# Patient Record
Sex: MALE | Race: WHITE
[De-identification: names, ages, dates, MRNs, and addresses within clinical notes are randomized per-mention and may not be internally consistent; named-entity substitution may affect disease eponyms.]

---

## 2018-10-29 ENCOUNTER — HOSPITAL ENCOUNTER (OUTPATIENT)
Dept: HOSPITAL 80 - FIMAGING | Age: 53
End: 2018-10-29
Attending: PSYCHIATRY & NEUROLOGY
Payer: MEDICAID

## 2018-10-29 DIAGNOSIS — G95.20: ICD-10-CM

## 2018-10-29 DIAGNOSIS — M50.31: ICD-10-CM

## 2018-10-29 DIAGNOSIS — M50.20: ICD-10-CM

## 2018-10-29 DIAGNOSIS — M48.02: ICD-10-CM

## 2018-10-29 DIAGNOSIS — M50.322: ICD-10-CM

## 2018-10-29 DIAGNOSIS — R94.02: Primary | ICD-10-CM

## 2018-10-29 PROCEDURE — A9585 GADOBUTROL INJECTION: HCPCS

## 2018-12-13 NOTE — POSTOPPROG
Post Op Note


Date of Operation: 12/13/18


Surgeon: Jorge Gonzalez


Assistant: Shira Harkins PA-C


Anesthesiologist: Dr. Madrid


Anesthesia: GET(General Endotracheal)


Pre-op Diagnosis: Cervical stenosis


Post-op Diagnosis: Cervical stenosis


Procedure: ACDF C3/4 and C5/6


Inf/Abcess present in the surg proc area at time of surgery?: No


Depth: Deep Incisional (Fascial)


EBL: Minimal


Drains: Leland Eddy





Plan


Plan: 


54 yo male s/p ACDF C3/4 and C5/6


- neuro checks


- pain control


- advance diet as tolerated


- hard collar


- ZO drain


- postop x-rays in am


- PT/OT/SLP





Exam


Seen in recovery.


Awake. Alert.


Following commands


Strength full


Incision with dressing c/d/i

## 2018-12-13 NOTE — PDANEPAE
ANE History of Present Illness





acdf





ANE Past Medical History





- Cardiovascular History


Hx Hypertension: No


Hx Arrhythmias: No


Hx Chest Pain: No


Hx Coronary Artery / Peripheral Vascular Disease: No


Hx CHF / Valvular Disease: No


Hx Palpitations: No





- Pulmonary History


Hx COPD: Yes


Hx Asthma/Reactive Airway Disease: No


Hx Recent Upper Respiratory Infection: No


Hx Oxygen in Use at Home: No


Hx Sleep Apnea: No





- Neurologic History


Hx Cerebrovascular Accident: No


Hx Seizures: No


Hx Dementia: No





- Endocrine History


Hx Diabetes: No


Hypothyroid: No


Hyperthyroid: No


Obesity: no





- Renal History


Hx Renal Disorders: No





- Liver History


Hx Hepatic Disorders: No





- Cancer History


Hx Cancer: Yes


Cancer History Comment: hodgkin's lymphoma





ANE Review of Systems


Review of Systems: 








- Exercise capacity


Exercise capacity: >=4 METS





ANE Patient History





- Allergies


Allergies/Adverse Reactions: 








No Known Allergies Allergy (Unverified 12/07/18 15:30)


 








- Home Medications


Home medications: home medication list seen and reviewed


Home Medications: 








Vitamin B Complex [Vitamin B Complex (OTC)] 1 each PO DAILY 12/07/18 [Last 

Taken 12/06/18]


Herbals/Supplements -Info Only  12/13/18 [Last Taken 12/10/18]








- NPO status


NPO Status: no food or drink >8 hours


NPO Since - Liquids (Date): 12/12/18


NPO Since - Liquids (Time): 20:00


NPO Since - Solids (Date): 12/12/18


NPO Since - Solids (Time): 20:00





- Anes Hx


Anes Hx: no prior problems





ANE Labs/Vital Signs





- Vital Signs


Blood Pressure: 112/67


Heart Rate: 75


Respiratory Rate: 20


O2 Sat (%): 93


Height: 177.8 cm


Weight: 72.575 kg





ANE Physical Exam





- Airway


Mallampati Score: Class 2


Mouth exam: normal dental/mouth exam, poor dentition





- Pulmonary


Pulmonary: no respiratory distress





- Cardiovascular


Cardiovascular: regular rate and rhythym





- ASA Status


ASA Status: II





ANE Anesthesia Plan


Anesthesia Plan: general endotracheal anesthesia

## 2018-12-14 NOTE — PDIAF
- Diagnosis


Diagnosis: Cervical stenosis s/p ACDF


Code Status: Full Code





- Medication Management


Discharge Medications: electronically signed and located in the Home Medication 

List.





- Orders


Services needed: Home Care, Certified Nursing Aide


Home Care Face to Face: I certify that this patient was under my care and that 

I had the required face-to-face encounter meeting the encounter requirements on 

the discharge day.  My findings support the fact that the patient is homebound 

as defined in


Home Care Face to Face Continued: CMS Chapter 7 Medicare Benefits Manual 30.1.1

, The condition of the patient is such that there exists a normal inability to 

leave home and consequently, leaving home would require a considerable and 

taxing effort.


Diet Recommendation: no restrictions on diet


Diet Texture: Regular Texture Diet


Wound Care Instructions: OK to shower and get incision wet starting Sunday 12/ 16.  Be gentle. No scrubbing.


Activity/Weight Bearing Restrictions: Wear collar at all times.  Refrain from 

lifting more than 10 pounds.


Additional Instructions: 


Wear collar at all times


Refrain from lifting more than 10 pounds or strenuous activity


Avoid Ibuprofen, Motrin, Aleve, Advil.  OK to take Tylenol.


Call Dr. Gonzalez's office with any questions/concerns.  [phone].  FU in 2 

weeks.





- Follow Up Care


Current Providers and Referrals: 


NONE *PRIMARY CARE P,. [Primary Care Provider] - 


Jorge Gonzalez MD [Medical Doctor] - follow up in 2 weeks

## 2018-12-14 NOTE — NEUSURGPN
Date of Surgery: 12/13/18


Post Op Day: 1


Assessment/Plan: 





52 yo male s/p ACDF C3/4 and C5/6





- neuro stable


- pain controlled


- postop C-spine x-rays pending


- remove ZO drain today


- PT/OT/SLP


- If continues to progress well then will discharge home today





Discussed with Dr. Gonzalez.


Subjective: 


Has residual fingertip numbness.  No new complaints.


Objective: 


Awake. Alert. PERRL. EOMI


Facial expression symmetrical


Muscle strength full at 5/5


Sensation intact


Incision with dressing c/d/i





- Physician


Discussed Patient with Dr.: Gonzalez





Neurosurgery Physical Exam





- Vitals, I&O, Labs





 I and O











 12/13/18 12/14/18 12/15/18





 05:59 05:59 05:59


 


Intake Total  1930 


 


Output Total  1265 


 


Balance  665 


 


Weight  72.575 kg 


 


Intake:   


 


  Oral (ml)  980 


 


  IV Intake (ml)  950 


 


Output:   


 


  Urine (ml)  1200 


 


    Urinal  1200 


 


  Estimated Blood Loss (ml)  25 


 


  ZO Drain Output (ml)  40 


 


    Anterior Neck Leland  40 





    Eddy   








 Vital Signs











Temp Pulse Resp BP Pulse Ox


 


 36.5 C   73   14   127/71 H  94 


 


 12/14/18 07:47  12/14/18 07:47  12/14/18 07:47  12/14/18 07:47  12/14/18 07:47














ICD10 Worksheet


Patient Problems: 


 Problems











Problem Status Onset


 


Cervical stenosis of spine Acute  














- ICD10 Problem Qualifiers


(1) Cervical stenosis of spine

## 2018-12-14 NOTE — ASMTDCNOTE
Case Management Discharge

 

Discharge Order Complete?     Answers:  Yes                                   

Patient to Obtain             Answers:  Other                         Notes:  Friend

Medications                                                                   

Transportation Arranged       Answers:  Family/Friends                        

Faxed Final Orders            Answers:  Yes                           Notes:  [facility] has access to EMR

Family Notified               Answers:  Yes                           Notes:  CM spoke with pt's frie
nd

Discharge Comments            

Notes:

CM spoke with pt, RN and friend who is supportive of pt. Pt reports that he lives in  on a 

property and doesn't have running water in it but can walk to a bathroom nearby. Pt reports he is 

not concerned about his living conditions and wants to be discharged home.  Pt reports he has a 

heater at home. Pt is agreeable to receiving meals on wheels. Pt's friend, Narcisa 

([phone]) said she arranged for meals to start arriving after discharge. Narcisa requested 

that pt recieved [facility] services. Pt reports PCP is Akshat Vivas at hospitals in [place]. CM provided 

education to pt and eventually was agreeable since pt's friend is going to be out of town in the 

near future.  CM linked pt with [facility]. Pt's physical address is: [facility] [address] [place] [address]. CM 


provided pt education on Medicaid transportation for MD appts. Pt appreciative of information. CM 

contacted pt's friend who is able to provide transportation home at discharge. CM answered 

questions and concerns and pt agreeable to discharge plan. 



Plan: Home with [facility]. 

 

Date Signed:  12/14/2018 02:10 PM

Electronically Signed By:PAYAM Crenshaw

## 2018-12-14 NOTE — ASMTLACE
LACE

 

Length of stay for            Answers:  1 day                                 

current admission                                                             

Acuity / Level of             Answers:  Yes                                   

Care: Did the patient                                                         

have an inpatient                                                             

admission?                                                                    

Comorbidities - select        Answers:  Any tumor (including                  

all that apply                          lymphoma or leukemia)                 

                                        Chronic pulmonary disease             

                                        Opioid dependence                     

                                        / Chronic pain                        

# of Emergency department     Answers:  0                                     

visits in the last 6                                                          

months                                                                        

Social determinants           Answers:  History of substance                  

                                        abuse (ETOH, street                   

                                        drugs, prescription                   

                                        drugs, etc.)                          

                                        Lack of community                     

                                        resources and/or lack of              

                                        social support (no                    

                                        pcp, lives                            

                                        alone, transportation, abigail            

                                        d)                                    

Score: 19

 

Date Signed:  12/14/2018 02:21 PM

Electronically Signed By:PAYAM Crenshaw

## 2018-12-14 NOTE — PDMN
Medical Necessity


Medical necessity: Pt meets IP criteria as of 12/13/2018 per MD and MCG SG-MS; 

los > 2 mn s/p ACDF C3-4, 5-6; Medicare IP only procedure

## 2018-12-15 NOTE — GOP
DATE OF OPERATION:  12/13/2018



SURGEON:  Jorge Gonzalez MD



ASSISTANT:  Shira Harkins PA-C.



PREOPERATIVE DIAGNOSIS:  Cervical myelopathy.



POSTOPERATIVE DIAGNOSIS:  Cervical myelopathy.



PROCEDURE PERFORMED:  

1.  C3-4 anterior cervical diskectomy and fusion.

2.  C5-6 anterior cervical diskectomy and fusion.

3.  Placement of interbody device, C3-4, C5-6.

4.  Anterior spinal instrumentation, C3-4, C5-6.

5.  [place] of local autograft.

6.  Use of allograft demineralized bone matrix.

7.  Use of the operative microscope.

8.  Intraoperative neurophysiologic monitoring of somatosensory evoked potentials and motor evoked po
tentials.



FINDINGS:  Successful ACDF.



SPECIMENS:  There were no specimens.



ESTIMATED BLOOD LOSS:  30 cc.



INDICATIONS:  The patient is a 53-year-old man who presented with increasing numbness and weakness of
 his hands.  MRI showed severe stenosis at C3-4 with cord compression and signal change as well as le
ss severe stenosis with signs of myelomalacia and cord signal changes C5-6.  Ultimately, we discussed
 the options and elected to proceed with C3-4 and C5-6 anterior cervical diskectomy and fusion.  We d
iscussed the utility of doing C4-5.  However, the disk at that level looks relatively normal.  Theref
ore, we elected not to include this.



DESCRIPTION OF PROCEDURE:  After informed consent was obtained from the patient, the patient brought 
to the operating room and was placed in supine position on the operating table.  A formal time-out wa
s performed, identifying the patient by name, medical record number, and date of birth.  Preoperative
 antibiotics were given.  The endotracheal tube was placed and general endotracheal anesthesia was sm
oothly induced.  A shoulder roll was placed and the neck was extended onto a headrest.  A lateral rad
iograph confirmed the level of the incision to bisect between C3-4 and C5-6 disk spaces.  The neck wa
s prepped and draped in normal sterile fashion.  10 cc of 0.25% Marcaine with epinephrine was infiltr
ated in the skin for hemostasis.  The skin incision made using a 10 blade.  The subcutaneous tissues 
were dissected using monopolar electrocautery.  The skin was undermined over the platysma and the arya
tysma was opened in line with its fibers just medial to the sternocleidomastoid.  The avascular plane
 between the midline structures and the sternocleidomastoid was then carefully dissected with care no
t to interrupt the neurovascular structures or the esophagus.  The prevertebral fascia was opened and
 the esophagus was retracted medially and a longer plane of dissection was established so that we did
 not have to retract so much from the esophagus.  At this point, the disk space markers were placed a
t C3-4 and C5-6 and lateral radiograph confirmed the appropriate levels.  The disk was then marked us
ing the Bovie, such that the disk spaces were clear.  The longus colli muscles were elevated laterall
y at C3-4 and self-retaining retractors were placed.  Drummond distraction was placed across the C3-4 d
isk space and the operative microscope was brought on the field.  Remainder of procedure was performe
d under high-power magnification.  We began by performing complete diskectomy using curettes and Tapia
taryn punches.  As we coursed to the more posterior aspect of the disk space, the osteophytes were al
te evident and the posterior osteophytes were drilled down.  The posterior longitudinal ligament was 
then opened and remaining disk fragments were removed posteriorly exposing the dura and completely de
compressing from 1 foramen to the other.  Once a good decompression was obtained, the motor evoked po
tentials remained stable.  The endplates were then decorticated and the disk space was sized for a 7 
mm Medtronic PTC cage.  This was packed with locally harvested autograft from the endplates and demin
eralized bone matrix and was placed into the disk space.  A 17 mm Zevo plate was then sized to the an
terior surface of the spine and was secured to the C3 and C4 vertebral bodies using 17 mm titanium sc
rews.  At this point, a lateral radiograph confirmed good placement of all the hardware and the neuro
physiologic monitoring remained stable.  We then moved our retractors down to the C5-6 level.  Again,
 the longus colli muscles were elevated and Drummond distraction was placed across the C5-C6 disk space
.  Again, a complete diskectomy was performed.  The posterior osteophytes were drilled down.  The pos
terior longitudinal ligament was opened and complete decompression of the disk space was obtained fro
m foramen to foramen.  This disk space was then sized for an 8 mm Medtronic PTC cage, which was packe
d with locally harvested autograft and allograft demineralized bone matrix.  This was again placed in
to the C5-C6 disk space and a 21 mm Medtronic Zevo plate was sized and secured to the C5 and C6 verte
bral bodies using 17 mm titanium screws.  At this point, AP and lateral radiograph confirmed good arya
cement of all the hardware and an AP radiograph confirmed that both plates were in the midline.  



At this point, the wound was copiously irrigated using bacitracin irrigation.  All bleeding was contr
olled using bipolar electrocautery.  A 10-Slovenian silastic drain was placed in the prevertebral space 
and tunneled out sterilely.  At this point, when there was no further bleeding, the platysma was clos
ed using interrupted 3-0 Vicryl.  The deep dermis was closed using interrupted 3-0 Vicryl.  The skin 
was closed using Dermabond.  Sterile dressings were placed.  The patient was awakened in the operatin
g room and transferred to the PACU in stable condition.  There were no operative complications.  I wa
s scrubbed and present for the entire procedure. All sponge and needle counts were correct at the end
 of the case.



FLUIDS AND URINE OUTPUT:  Per the anesthesia record.



DRAIN:  10-Slovenian ZO.





Job #:  [id number]

## 2018-12-18 NOTE — GDS
ADMITTING DIAGNOSIS:  Cervical myelopathy.



DISCHARGE DIAGNOSIS:  Cervical myelopathy.



PROCEDURE PERFORMED:  Anterior cervical diskectomy and fusion at C3-4 and C5-6.



CONSULTS:  Physical and Occupational Therapy.



HOSPITAL COURSE:  The patient is a 53-year-old male who presented with increasing numbness and weakne
ss of his hands.  MRI showed severe stenosis, and thus, he elected to proceed with surgical intervent
ion.  He underwent surgery by Dr. Gonzalez with an anterior cervical diskectomy and fusion at C3-4 and C
5-6.  He tolerated surgery well without complication and was transferred to the floor for further joon
n control and neurological checks.  He was fitted with a cervical hard collar.  ZO drain was removed 
once output was reduced.  Cervical spine x-rays were completed, demonstrating hardware in good placem
ent.  On the following day, he was voiding without difficulty, pain controlled, medically stable, and
 tolerating a diet, and thus, he was deemed suitable for discharge.  He was discharged to home on Dec
ember 14, 2018.



DISCHARGE INSTRUCTIONS:  Patient is to wear a collar at all times, except when showering.  He needs t
o refrain from lifting more than 10 pounds.  He will follow up with Dr. Gonzalez in 2 weeks.





Job #:  [id number]